# Patient Record
Sex: FEMALE | URBAN - NONMETROPOLITAN AREA
[De-identification: names, ages, dates, MRNs, and addresses within clinical notes are randomized per-mention and may not be internally consistent; named-entity substitution may affect disease eponyms.]

---

## 2017-11-29 NOTE — PATIENT DISCUSSION
Patient states unhappy with distance vision with temporary improvement with AT's. Exam showed dry eyes with 1+ SPK OU.

## 2017-11-29 NOTE — PATIENT DISCUSSION
Artificial Tears: One drop to both eyes 3-4 times daily. We recommend Systane or Refresh lubricating eye drops which can be found at any pharmacy. Aggressively treat with Thera-tears nutrition .

## 2018-10-08 NOTE — PROCEDURE NOTE: CLINICAL
PROCEDURE NOTE: Punctal Plugs, Silicone #1 OD. Diagnosis: Dysfunctional Tear Syndrome. Anesthesia: Topical. Prep: Antibiotic Drops q 5min x 3. Prior to treatment, the risks/benefits/alternatives were discussed. The patient wished to proceed with procedure. Permanent silicone plugs were inserted. Patient tolerated procedure well. There were no complications. Post procedure instructions given. Size 0.6. [facility] [employer] PROCEDURE NOTE: Punctal Plugs, Silicone #1 OS. Diagnosis: Dysfunctional Tear Syndrome. Anesthesia: Topical. Prep: Antibiotic Drops q 5min x 3. Prior to treatment, the risks/benefits/alternatives were discussed. The patient wished to proceed with procedure. Permanent silicone plugs were inserted. Patient tolerated procedure well. There were no complications. Post procedure instructions given. Size 0.6. [employer]

## 2019-04-08 ENCOUNTER — IMPORTED ENCOUNTER (OUTPATIENT)
Dept: URBAN - NONMETROPOLITAN AREA CLINIC 1 | Facility: CLINIC | Age: 59
End: 2019-04-08

## 2019-04-08 PROBLEM — H52.223: Noted: 2019-04-08

## 2019-04-08 PROBLEM — H16.223: Noted: 2019-04-08

## 2019-04-08 PROBLEM — H52.03: Noted: 2019-04-08

## 2019-04-08 PROBLEM — H25.13: Noted: 2019-04-08

## 2019-04-08 PROBLEM — H52.4: Noted: 2019-04-08

## 2019-04-08 PROCEDURE — 92015 DETERMINE REFRACTIVE STATE: CPT

## 2019-04-08 PROCEDURE — 92004 COMPRE OPH EXAM NEW PT 1/>: CPT

## 2019-04-08 NOTE — PATIENT DISCUSSION
Compound Hyperopic Astigmatism OU w/Presbyopia-  discussed findings w/patient-  new spectacle Rx issued-  continue to monitor yearly or prnNuclear Sclerosis OU-  discussed findings w/patient-  no treatment indicated at this time-  UV protection recommended-  monitor yearly or prn SINAN OU-  discussed findings w/patient-  start Refresh TID OU samples given-  monitor yearly or prn; 's Notes: MR 4/8/2019DFE 4/8/2019

## 2022-04-09 ASSESSMENT — VISUAL ACUITY
OS_SC: 20/20-1
OD_SC: 20/25

## 2022-04-09 ASSESSMENT — TONOMETRY
OS_IOP_MMHG: 15
OD_IOP_MMHG: 15

## 2022-12-19 ENCOUNTER — NEW PATIENT (OUTPATIENT)
Dept: RURAL CLINIC 1 | Facility: CLINIC | Age: 62
End: 2022-12-19

## 2022-12-19 PROCEDURE — 92004 COMPRE OPH EXAM NEW PT 1/>: CPT

## 2022-12-19 PROCEDURE — 92015 DETERMINE REFRACTIVE STATE: CPT

## 2022-12-19 ASSESSMENT — VISUAL ACUITY
OS_CC: 20/25-2
OD_CC: 20/20-2

## 2022-12-19 ASSESSMENT — TONOMETRY
OD_IOP_MMHG: 15
OS_IOP_MMHG: 16

## 2024-03-21 ENCOUNTER — COMPREHENSIVE EXAM (OUTPATIENT)
Dept: URBAN - NONMETROPOLITAN AREA CLINIC 4 | Facility: CLINIC | Age: 64
End: 2024-03-21

## 2024-03-21 DIAGNOSIS — H16.223: ICD-10-CM

## 2024-03-21 DIAGNOSIS — H52.223: ICD-10-CM

## 2024-03-21 DIAGNOSIS — H52.4: ICD-10-CM

## 2024-03-21 DIAGNOSIS — H25.13: ICD-10-CM

## 2024-03-21 DIAGNOSIS — H52.03: ICD-10-CM

## 2024-03-21 PROCEDURE — 92015 DETERMINE REFRACTIVE STATE: CPT

## 2024-03-21 PROCEDURE — 92014 COMPRE OPH EXAM EST PT 1/>: CPT

## 2024-03-21 ASSESSMENT — VISUAL ACUITY
OS_BAT: 20/50
OS_CC: 20/25
OD_CC: 20/30
OD_BAT: 20/50

## 2024-03-21 ASSESSMENT — TONOMETRY
OD_IOP_MMHG: 15
OS_IOP_MMHG: 15